# Patient Record
Sex: FEMALE | Race: BLACK OR AFRICAN AMERICAN | NOT HISPANIC OR LATINO
[De-identification: names, ages, dates, MRNs, and addresses within clinical notes are randomized per-mention and may not be internally consistent; named-entity substitution may affect disease eponyms.]

---

## 2019-02-16 ENCOUNTER — RESULT REVIEW (OUTPATIENT)
Age: 34
End: 2019-02-16

## 2019-08-23 ENCOUNTER — OUTPATIENT (OUTPATIENT)
Dept: OUTPATIENT SERVICES | Facility: HOSPITAL | Age: 34
LOS: 1 days | End: 2019-08-23

## 2019-08-23 VITALS
TEMPERATURE: 98 F | RESPIRATION RATE: 16 BRPM | WEIGHT: 119.05 LBS | DIASTOLIC BLOOD PRESSURE: 78 MMHG | HEART RATE: 92 BPM | SYSTOLIC BLOOD PRESSURE: 118 MMHG | HEIGHT: 66 IN

## 2019-08-23 DIAGNOSIS — R82.90 UNSPECIFIED ABNORMAL FINDINGS IN URINE: ICD-10-CM

## 2019-08-23 DIAGNOSIS — N84.0 POLYP OF CORPUS UTERI: ICD-10-CM

## 2019-08-23 DIAGNOSIS — Q05.9 SPINA BIFIDA, UNSPECIFIED: ICD-10-CM

## 2019-08-23 DIAGNOSIS — Z98.890 OTHER SPECIFIED POSTPROCEDURAL STATES: Chronic | ICD-10-CM

## 2019-08-23 DIAGNOSIS — Z91.040 LATEX ALLERGY STATUS: ICD-10-CM

## 2019-08-23 DIAGNOSIS — Z87.728 PERSONAL HISTORY OF OTHER SPECIFIED (CORRECTED) CONGENITAL MALFORMATIONS OF NERVOUS SYSTEM AND SENSE ORGANS: Chronic | ICD-10-CM

## 2019-08-23 LAB
ANION GAP SERPL CALC-SCNC: 9 MMO/L — SIGNIFICANT CHANGE UP (ref 7–14)
BUN SERPL-MCNC: 13 MG/DL — SIGNIFICANT CHANGE UP (ref 7–23)
CALCIUM SERPL-MCNC: 9.5 MG/DL — SIGNIFICANT CHANGE UP (ref 8.4–10.5)
CHLORIDE SERPL-SCNC: 104 MMOL/L — SIGNIFICANT CHANGE UP (ref 98–107)
CO2 SERPL-SCNC: 27 MMOL/L — SIGNIFICANT CHANGE UP (ref 22–31)
CREAT SERPL-MCNC: 0.55 MG/DL — SIGNIFICANT CHANGE UP (ref 0.5–1.3)
GLUCOSE SERPL-MCNC: 79 MG/DL — SIGNIFICANT CHANGE UP (ref 70–99)
HCT VFR BLD CALC: 43.7 % — SIGNIFICANT CHANGE UP (ref 34.5–45)
HGB BLD-MCNC: 13.7 G/DL — SIGNIFICANT CHANGE UP (ref 11.5–15.5)
MCHC RBC-ENTMCNC: 28.1 PG — SIGNIFICANT CHANGE UP (ref 27–34)
MCHC RBC-ENTMCNC: 31.4 % — LOW (ref 32–36)
MCV RBC AUTO: 89.7 FL — SIGNIFICANT CHANGE UP (ref 80–100)
NRBC # FLD: 0 K/UL — SIGNIFICANT CHANGE UP (ref 0–0)
PLATELET # BLD AUTO: 315 K/UL — SIGNIFICANT CHANGE UP (ref 150–400)
PMV BLD: 10 FL — SIGNIFICANT CHANGE UP (ref 7–13)
POTASSIUM SERPL-MCNC: 3.9 MMOL/L — SIGNIFICANT CHANGE UP (ref 3.5–5.3)
POTASSIUM SERPL-SCNC: 3.9 MMOL/L — SIGNIFICANT CHANGE UP (ref 3.5–5.3)
RBC # BLD: 4.87 M/UL — SIGNIFICANT CHANGE UP (ref 3.8–5.2)
RBC # FLD: 13.6 % — SIGNIFICANT CHANGE UP (ref 10.3–14.5)
SODIUM SERPL-SCNC: 140 MMOL/L — SIGNIFICANT CHANGE UP (ref 135–145)
WBC # BLD: 4.63 K/UL — SIGNIFICANT CHANGE UP (ref 3.8–10.5)
WBC # FLD AUTO: 4.63 K/UL — SIGNIFICANT CHANGE UP (ref 3.8–10.5)

## 2019-08-23 NOTE — H&P PST ADULT - NSICDXFAMILYHX_GEN_ALL_CORE_FT
FAMILY HISTORY:  Mother  Still living? Unknown  Family history of diabetes mellitus (DM), Age at diagnosis: Age Unknown  FH: HTN (hypertension), Age at diagnosis: Age Unknown

## 2019-08-23 NOTE — H&P PST ADULT - NSICDXPROBLEM_GEN_ALL_CORE_FT
PROBLEM DIAGNOSES  Problem: Polyp of corpus uteri  Assessment and Plan: Pt scheduled for surgery on 9/4/19.  Pre-op instructions provided. Pt verbalized understanding.   Pepcid provided for GI prophylaxis.   Pt given urine specimen cup for ucg on admission.     Problem: Cloudy urine  Assessment and Plan: Requested medical evaluation preop. Pt verbalized understanding.     Problem: Latex allergy  Assessment and Plan: OR booking notified.     Problem: Spina bifida  Assessment and Plan: Pt has not seen a neurologist in about 4 years.   Case discussed with anesthesia Dr. Watkins.  Will request last neuro visit note.

## 2019-08-23 NOTE — H&P PST ADULT - HISTORY OF PRESENT ILLNESS
33 year old female with c/o spotting between periods a few months ago. Pt went for evaluation with GYN and had u/s which revealed ueterine polyp. Pt presents today for presurgical evaluation for ... 33 year old female with c/o spotting between periods a few months ago. Pt went for evaluation with GYN and had u/s which revealed ueterine polyp. Pt presents today for presurgical evaluation for Dilation Curettage Hysteroscopy, Polypectomy with Symphion scheduled on 9/4/19.

## 2019-08-23 NOTE — H&P PST ADULT - NSICDXPASTMEDICALHX_GEN_ALL_CORE_FT
PAST MEDICAL HISTORY:  Bilateral foot-drop wears AFO foot brace    Breast fibroadenoma, left     Spina bifida diagnosed in 1999    Urinary retention self-catheterizes several times a day

## 2019-08-23 NOTE — H&P PST ADULT - NEUROLOGICAL COMMENTS
bilateral foot drop, reports weakness in legs and balance impairment bilateral foot drop, reports weakness in legs and some balance impairment - pt states she has not seen a neurologist in about 4 years

## 2019-08-23 NOTE — H&P PST ADULT - OTHER CARE PROVIDERS
Cardio - Dr. Heydi Galicia [phone] Cardio - Dr. Heydi Galicia [phone] (has not seen in several years), Neuro - Dr. Genao (has not seen in several years) Cardio - Dr. Heydi Galicia [phone] (has not seen in several years), Neuro - Dr. Genao [phone] (has not seen in several years)

## 2019-08-23 NOTE — H&P PST ADULT - NSICDXPASTSURGICALHX_GEN_ALL_CORE_FT
PAST SURGICAL HISTORY:  H/O spina bifida surgery in 1999,, 2001    S/P excision of fibroadenoma of breast

## 2019-08-23 NOTE — H&P PST ADULT - NEGATIVE ENMT SYMPTOMS
no dysphagia/no sinus symptoms/no vertigo/no throat pain/no hearing difficulty/no ear pain/no tinnitus

## 2019-08-23 NOTE — H&P PST ADULT - NEGATIVE NEUROLOGICAL SYMPTOMS
no syncope/no focal seizures/no weakness/no generalized seizures no syncope/no generalized seizures/no focal seizures

## 2019-09-01 ENCOUNTER — TRANSCRIPTION ENCOUNTER (OUTPATIENT)
Age: 34
End: 2019-09-01

## 2019-09-03 ENCOUNTER — TRANSCRIPTION ENCOUNTER (OUTPATIENT)
Age: 34
End: 2019-09-03

## 2019-09-03 RX ORDER — SODIUM CHLORIDE 9 MG/ML
1000 INJECTION, SOLUTION INTRAVENOUS
Refills: 0 | Status: DISCONTINUED | OUTPATIENT
Start: 2019-09-04 | End: 2019-09-19

## 2019-09-03 NOTE — ASU PATIENT PROFILE, ADULT - PMH
Bilateral foot-drop  wears AFO foot brace  Breast fibroadenoma, left    Spina bifida  diagnosed in 1999  Urinary retention  self-catheterizes several times a day

## 2019-09-04 ENCOUNTER — RESULT REVIEW (OUTPATIENT)
Age: 34
End: 2019-09-04

## 2019-09-04 ENCOUNTER — OUTPATIENT (OUTPATIENT)
Dept: OUTPATIENT SERVICES | Facility: HOSPITAL | Age: 34
LOS: 1 days | Discharge: HOME | End: 2019-09-04
Payer: COMMERCIAL

## 2019-09-04 VITALS
OXYGEN SATURATION: 98 % | HEART RATE: 84 BPM | RESPIRATION RATE: 12 BRPM | SYSTOLIC BLOOD PRESSURE: 123 MMHG | DIASTOLIC BLOOD PRESSURE: 53 MMHG

## 2019-09-04 VITALS
OXYGEN SATURATION: 99 % | HEART RATE: 107 BPM | SYSTOLIC BLOOD PRESSURE: 127 MMHG | DIASTOLIC BLOOD PRESSURE: 74 MMHG | TEMPERATURE: 99 F | HEIGHT: 66 IN | RESPIRATION RATE: 18 BRPM | WEIGHT: 119.05 LBS

## 2019-09-04 DIAGNOSIS — Z87.728 PERSONAL HISTORY OF OTHER SPECIFIED (CORRECTED) CONGENITAL MALFORMATIONS OF NERVOUS SYSTEM AND SENSE ORGANS: Chronic | ICD-10-CM

## 2019-09-04 DIAGNOSIS — Z98.890 OTHER SPECIFIED POSTPROCEDURAL STATES: Chronic | ICD-10-CM

## 2019-09-04 DIAGNOSIS — N84.0 POLYP OF CORPUS UTERI: ICD-10-CM

## 2019-09-04 LAB — HCG UR QL: NEGATIVE — SIGNIFICANT CHANGE UP

## 2019-09-04 PROCEDURE — 88305 TISSUE EXAM BY PATHOLOGIST: CPT | Mod: 26

## 2019-09-04 NOTE — ASU DISCHARGE PLAN (ADULT/PEDIATRIC) - NURSING INSTRUCTIONS
You were given 1000mg IV Tylenol for pain management.  Please DO NOT take any Tylenol containing products, such as  Vicodin, Percocet, Excedrin, many cold preparations for the next 6 hours (until 2p).  DO NOT EXCEED 3000MG OF TYLENOL OVER 24 HOURS.   You were given Toradol for pain management. Please DO Not take Motrin/Ibuprofen/Advil/Aleve (NSAIDS) for the next 6 hours (Until 2p)

## 2019-09-04 NOTE — ASU DISCHARGE PLAN (ADULT/PEDIATRIC) - CARE PROVIDER_API CALL
Piyush George)  Obstetrics and Gynecology  [address] [place] [address]  Phone: [phone]  Fax: [phone]  Follow Up Time:

## 2020-10-13 NOTE — H&P PST ADULT - NSANTHGENDERRD_ENT_A_CORE
INTERVAL HPI/OVERNIGHT EVENTS:  Patient seen at bedside.      VITAL SIGNS:  T(F): 98.9 (10-13-20 @ 10:00)  HR: 68 (10-13-20 @ 10:00)  BP: 100/52 (10-13-20 @ 10:00)  RR: 18 (10-13-20 @ 10:00)  SpO2: 100% (10-13-20 @ 10:00)  Wt(kg): --    PHYSICAL EXAM:    Constitutional: NAD, resting in bed comfortably  Eyes: EOMI, sclera non-icteric  Neck: supple, no LAD  Respiratory: CTA b/l, good air entry b/l, no wheezing, rhonchi or crackles  Cardiovascular: RRR, normal S1S2, no M/R/G  Gastrointestinal: soft, NTND  Extremities: no edema  Neurological: AAOx3, non focal  Skin: Normal temperature    MEDICATIONS  (STANDING):  chlorhexidine 4% Liquid 1 Application(s) Topical daily  enoxaparin Injectable 40 milliGRAM(s) SubCutaneous daily  midodrine. 10 milliGRAM(s) Oral every 8 hours  ondansetron Injectable 4 milliGRAM(s) IV Push every 8 hours  pancrelipase  (CREON 12,000 Lipase Units) 1 Capsule(s) Oral three times a day with meals    MEDICATIONS  (PRN):  acetaminophen   Tablet .. 650 milliGRAM(s) Oral every 6 hours PRN Temp greater or equal to 38C (100.4F)      Allergies    No Known Allergies    Intolerances        LABS:                        10.6   5.73  )-----------( 168      ( 13 Oct 2020 06:00 )             32.4     10-13    136  |  103  |  8   ----------------------------<  82  3.6   |  22  |  0.40<L>    Ca    9.5      13 Oct 2020 06:00  Phos  3.1     10-13  Mg     2.1     10-13    TPro  7.9  /  Alb  3.7  /  TBili  0.3  /  DBili  x   /  AST  107<H>  /  ALT  100<H>  /  AlkPhos  159<H>  10-13          RADIOLOGY & ADDITIONAL TESTS:  Studies reviewed.   INTERVAL HPI/OVERNIGHT EVENTS:  Patient seen at bedside.  Patient continues to feel weak  Usually feels this way after chemo  Afebrile overnight        VITAL SIGNS:  T(F): 98.9 (10-13-20 @ 10:00)  HR: 68 (10-13-20 @ 10:00)  BP: 100/52 (10-13-20 @ 10:00)  RR: 18 (10-13-20 @ 10:00)  SpO2: 100% (10-13-20 @ 10:00)  Wt(kg): --    PHYSICAL EXAM:    In accordance with current standard limiting patient contact, deferred physical exam  2/2 COVID pandemic  Please refer to physical exam of primary team.    MEDICATIONS  (STANDING):  chlorhexidine 4% Liquid 1 Application(s) Topical daily  enoxaparin Injectable 40 milliGRAM(s) SubCutaneous daily  midodrine. 10 milliGRAM(s) Oral every 8 hours  ondansetron Injectable 4 milliGRAM(s) IV Push every 8 hours  pancrelipase  (CREON 12,000 Lipase Units) 1 Capsule(s) Oral three times a day with meals    MEDICATIONS  (PRN):  acetaminophen   Tablet .. 650 milliGRAM(s) Oral every 6 hours PRN Temp greater or equal to 38C (100.4F)      Allergies    No Known Allergies    Intolerances        LABS:                        10.6   5.73  )-----------( 168      ( 13 Oct 2020 06:00 )             32.4     10-13    136  |  103  |  8   ----------------------------<  82  3.6   |  22  |  0.40<L>    Ca    9.5      13 Oct 2020 06:00  Phos  3.1     10-13  Mg     2.1     10-13    TPro  7.9  /  Alb  3.7  /  TBili  0.3  /  DBili  x   /  AST  107<H>  /  ALT  100<H>  /  AlkPhos  159<H>  10-13          RADIOLOGY & ADDITIONAL TESTS:  Studies reviewed.   No

## 2021-03-15 ENCOUNTER — RESULT REVIEW (OUTPATIENT)
Age: 36
End: 2021-03-15

## 2021-05-05 ENCOUNTER — RESULT REVIEW (OUTPATIENT)
Age: 36
End: 2021-05-05

## 2021-05-26 ENCOUNTER — RESULT REVIEW (OUTPATIENT)
Age: 36
End: 2021-05-26

## 2021-07-08 ENCOUNTER — RESULT REVIEW (OUTPATIENT)
Age: 36
End: 2021-07-08

## 2021-09-28 PROBLEM — D24.2 BENIGN NEOPLASM OF LEFT BREAST: Chronic | Status: ACTIVE | Noted: 2019-08-23

## 2021-09-28 PROBLEM — R33.9 RETENTION OF URINE, UNSPECIFIED: Chronic | Status: ACTIVE | Noted: 2019-08-23

## 2021-09-28 PROBLEM — Q05.9 SPINA BIFIDA, UNSPECIFIED: Chronic | Status: ACTIVE | Noted: 2019-08-23

## 2021-09-28 PROBLEM — M21.371 FOOT DROP, RIGHT FOOT: Chronic | Status: ACTIVE | Noted: 2019-08-23

## 2021-10-04 ENCOUNTER — APPOINTMENT (OUTPATIENT)
Dept: OBGYN | Facility: CLINIC | Age: 36
End: 2021-10-04

## 2021-10-04 ENCOUNTER — APPOINTMENT (OUTPATIENT)
Dept: ANTEPARTUM | Facility: CLINIC | Age: 36
End: 2021-10-04
Payer: COMMERCIAL

## 2021-10-04 PROCEDURE — 76813 OB US NUCHAL MEAS 1 GEST: CPT

## 2021-10-04 PROCEDURE — 76801 OB US < 14 WKS SINGLE FETUS: CPT

## 2021-10-06 ENCOUNTER — EMERGENCY (EMERGENCY)
Facility: HOSPITAL | Age: 36
LOS: 1 days | Discharge: HOME | End: 2021-10-06
Attending: STUDENT IN AN ORGANIZED HEALTH CARE EDUCATION/TRAINING PROGRAM | Admitting: STUDENT IN AN ORGANIZED HEALTH CARE EDUCATION/TRAINING PROGRAM
Payer: COMMERCIAL

## 2021-10-06 VITALS
RESPIRATION RATE: 16 BRPM | OXYGEN SATURATION: 100 % | SYSTOLIC BLOOD PRESSURE: 131 MMHG | DIASTOLIC BLOOD PRESSURE: 76 MMHG | HEART RATE: 81 BPM | TEMPERATURE: 99 F

## 2021-10-06 VITALS
HEIGHT: 66 IN | DIASTOLIC BLOOD PRESSURE: 87 MMHG | OXYGEN SATURATION: 100 % | HEART RATE: 110 BPM | RESPIRATION RATE: 18 BRPM | SYSTOLIC BLOOD PRESSURE: 137 MMHG | TEMPERATURE: 98 F

## 2021-10-06 DIAGNOSIS — Z87.728 PERSONAL HISTORY OF OTHER SPECIFIED (CORRECTED) CONGENITAL MALFORMATIONS OF NERVOUS SYSTEM AND SENSE ORGANS: Chronic | ICD-10-CM

## 2021-10-06 DIAGNOSIS — Z98.890 OTHER SPECIFIED POSTPROCEDURAL STATES: Chronic | ICD-10-CM

## 2021-10-06 PROCEDURE — 99284 EMERGENCY DEPT VISIT MOD MDM: CPT

## 2021-10-06 NOTE — ED PROVIDER NOTE - CLINICAL SUMMARY MEDICAL DECISION MAKING FREE TEXT BOX
37 y/o F 12 weeks pregnant p/w blurry vision which has since resolved. Case discussed w/ OB. Plan to document fetal heart rate. Pt does not want labs at this time because she states she has other obligations. Pt will f/u w/ OB as an outpatient. Strict return precautions given. Fetal heart rate 167.

## 2021-10-06 NOTE — ED ADULT TRIAGE NOTE - CHIEF COMPLAINT QUOTE
pt is 11wks preg. coming with 1 episode of blur vison that lasted 15min, with lightheaded and her /100 as per pt never had hi BP, denies Vag. bleeding, no ABD pain.

## 2021-10-06 NOTE — ED PROVIDER NOTE - PATIENT PORTAL LINK FT
You can access the FollowMyHealth Patient Portal offered by [facility] by registering at the following website: [email or web address]. By joining [facility]’s FollowMyHealth portal, you will also be able to view your health information using other applications (apps) compatible with our system.

## 2021-10-06 NOTE — ED PROVIDER NOTE - OBJECTIVE STATEMENT
37 y/o F 12 weeks pregnant (IVF) w/ PMHx spine bifida presents to the ED w/ blurred vision which resolved after 15 minutes. Pt states she checked her BP which was 210/100 and called her OB/GYN who referred her to the ED. Pt reports similar symptoms in the past that also resolve quickly. Denies vaginal bleeding or discharge, fever, cough, vomiting, diarrhea, or no pain in urination. Pt has had fibro adenoma removed on L breast, allergic to Macrobid and dexamethasone.
No

## 2021-10-06 NOTE — ED ADULT NURSE NOTE - OBJECTIVE STATEMENT
Pt arriving to intake room 4 A&OX4 ambulatory with a cane at baseline c/o blurry vision for 15 mins yesterday. Pt states BP was very high. Pt 11 weeks pregnant with 1st pregnancy. Symptoms have resolved. Pt denies CP, SOB, HA, blurry vision, abdominal pain, vaginal bleeding. VS as charted. MD garcia pending.

## 2021-10-06 NOTE — ED ADULT NURSE NOTE - DOES PATIENT HAVE ADVANCE DIRECTIVE
from 10/18 at 40.6 weeks gestation.   from 10/18 at 40.6 weeks gestation.  with an RML. Received methergine x1  from 10/18 at 40.6 weeks gestation. -infant in NICU No

## 2021-10-06 NOTE — ED PROVIDER NOTE - PHYSICAL EXAMINATION
CONSTITUTIONAL: Non-toxic, non-diaphoretic, in no apparent distress  HEAD: Normocephalic; atruamatic  EYES: EOM intact, OD 20/20, OS 20/20  ENMT: External appears normal; normal oropharynx, moist  NECK: grossly normal active ROM,  CARD: No cyanosis, good peripheral perfusion, RRR  RESP: Normal chest excursion with respiration; no increased work of breathing  ABD: non-distended   EXT: moving all extremities, no gross disfigurement or asymmetry,  SKIN: Warm, dry, no rash  NEURO:  moving all extremities, no facial droop, no dysarthria      cn2-12 intact  5/5 all extremities

## 2021-10-28 NOTE — ASU PREOP CHECKLIST - TYPE OF SOLUTION
Pt called the office and stated that she lives 3.5 hours away, so she will not be following up with Dr. Blair. The pt also stated that she does not need her medication refilled, and that she still has pills left.    LR

## 2021-11-16 ENCOUNTER — APPOINTMENT (OUTPATIENT)
Dept: ANTEPARTUM | Facility: CLINIC | Age: 36
End: 2021-11-16
Payer: COMMERCIAL

## 2021-11-16 VITALS
BODY MASS INDEX: 19.44 KG/M2 | HEIGHT: 66 IN | WEIGHT: 121 LBS | DIASTOLIC BLOOD PRESSURE: 66 MMHG | SYSTOLIC BLOOD PRESSURE: 102 MMHG

## 2021-11-16 PROCEDURE — 76811 OB US DETAILED SNGL FETUS: CPT

## 2021-12-01 ENCOUNTER — APPOINTMENT (OUTPATIENT)
Dept: ANTEPARTUM | Facility: CLINIC | Age: 36
End: 2021-12-01
Payer: COMMERCIAL

## 2021-12-01 ENCOUNTER — APPOINTMENT (OUTPATIENT)
Dept: OBGYN | Facility: CLINIC | Age: 36
End: 2021-12-01

## 2021-12-01 VITALS — SYSTOLIC BLOOD PRESSURE: 109 MMHG | BODY MASS INDEX: 20.01 KG/M2 | WEIGHT: 124 LBS | DIASTOLIC BLOOD PRESSURE: 70 MMHG

## 2021-12-01 VITALS
DIASTOLIC BLOOD PRESSURE: 70 MMHG | SYSTOLIC BLOOD PRESSURE: 109 MMHG | HEIGHT: 66 IN | WEIGHT: 124 LBS | BODY MASS INDEX: 19.93 KG/M2

## 2021-12-01 DIAGNOSIS — Z3A.19 19 WEEKS GESTATION OF PREGNANCY: ICD-10-CM

## 2021-12-01 DIAGNOSIS — Z82.49 FAMILY HISTORY OF ISCHEMIC HEART DISEASE AND OTHER DISEASES OF THE CIRCULATORY SYSTEM: ICD-10-CM

## 2021-12-01 DIAGNOSIS — Z78.9 OTHER SPECIFIED HEALTH STATUS: ICD-10-CM

## 2021-12-01 DIAGNOSIS — Z86.018 PERSONAL HISTORY OF OTHER BENIGN NEOPLASM: ICD-10-CM

## 2021-12-01 DIAGNOSIS — O09.812 SUPERVISION OF PREGNANCY RESULTING FROM ASSISTED REPRODUCTIVE TECHNOLOGY, SECOND TRIMESTER: ICD-10-CM

## 2021-12-01 DIAGNOSIS — Z83.3 FAMILY HISTORY OF DIABETES MELLITUS: ICD-10-CM

## 2021-12-01 DIAGNOSIS — Z83.42 FAMILY HISTORY OF FAMILIAL HYPERCHOLESTEROLEMIA: ICD-10-CM

## 2021-12-01 DIAGNOSIS — Z87.728 PERSONAL HISTORY OF OTHER SPECIFIED (CORRECTED) CONGENITAL MALFORMATIONS OF NERVOUS SYSTEM AND SENSE ORGANS: ICD-10-CM

## 2021-12-01 PROCEDURE — 76815 OB US LIMITED FETUS(S): CPT

## 2021-12-01 NOTE — OB HISTORY
[___] : Living: [unfilled] [SRIKANTH: ___] : SRIKANTH: [unfilled] [EGA: ___ wks] : EGA: [unfilled] wks [Reproductive Assisted] : Reproductive Assisted conception

## 2021-12-01 NOTE — SURGICAL HISTORY
[Fibroids] : fibroids [Infertility] : infertility [Abn Paps] : no abnormal pap smears [Breast Disease] : no breast disease [STI's] : no STI's [Cysts] : no cysts

## 2021-12-01 NOTE — HISTORY OF PRESENT ILLNESS
[FreeTextEntry1] : Patient is a 36 year old female, with medical history significant for spina bifida, currently at 19.5 weeks GA, with high-risk IVF pregnancy. Patient reports that her and her  were unable to become pregnant x 6 months. They saw a fertility specialist and patient was told she had diminished ovarian reserves. They made the decision to do IVF, but with no donor egg or donor sperm.\par \par Patient was seen for a fetal anatomical survey and limitations of ultrasound were discussed with her. The patient was informed that ultrasound cannot detect all anomalies. On scan today PRUV was noted.\par \par The patient was reassured. Explained to patient that invasive testing is not recommended based on these findings.

## 2021-12-01 NOTE — DISCUSSION/SUMMARY
[FreeTextEntry1] : Patient is a 36 year old, with medical history significant for spina bifida, currently at 19.5 weeks GA, with high-risk IVF pregnancy. She is a patient of Dr. Curry, seen in our office today for follow up anatomy scan after early anatomy two weeks ago was limited. Anatomy scan today unremarkable except for presence of persistent right umbilical vein. Patient recently consulted with anesthesiology. They were able to review her most recent MRI reports and determined that a spinal or epidural is contraindicated, as L3-L5 in patient's spine contains many abnormalities both from surgical scarring and natural pathologies due to spina bifida. I discussed with patient that from an MFM perspective she should be able to attempt vaginal birth, and could be managed with IV analgesia. She understands that this would provide less effective pain relief, but is agreeable. Additionally, patient and I discussed her bowel and bladder functions. She is continent of stool, but has urinary incontinence - for which she intermittently catheterizes herself. Therefore, would recommend routine evaluation of urine for infection. Requisition sent for fetal echo as this is an IVF pregnancy. Recommendation for patient to follow up with this office monthly due to the high-risk nature of this pregnancy.

## 2021-12-07 ENCOUNTER — APPOINTMENT (OUTPATIENT)
Dept: PEDIATRIC CARDIOLOGY | Facility: CLINIC | Age: 36
End: 2021-12-07
Payer: COMMERCIAL

## 2021-12-07 PROCEDURE — 76825 ECHO EXAM OF FETAL HEART: CPT

## 2021-12-07 PROCEDURE — 99202 OFFICE O/P NEW SF 15 MIN: CPT

## 2021-12-07 PROCEDURE — 76827 ECHO EXAM OF FETAL HEART: CPT

## 2021-12-07 PROCEDURE — 76820 UMBILICAL ARTERY ECHO: CPT

## 2021-12-07 PROCEDURE — 93325 DOPPLER ECHO COLOR FLOW MAPG: CPT | Mod: 59

## 2021-12-20 ENCOUNTER — APPOINTMENT (OUTPATIENT)
Dept: SPINE | Facility: CLINIC | Age: 36
End: 2021-12-20

## 2021-12-27 ENCOUNTER — TRANSCRIPTION ENCOUNTER (OUTPATIENT)
Age: 36
End: 2021-12-27

## 2022-01-03 ENCOUNTER — APPOINTMENT (OUTPATIENT)
Dept: OBGYN | Facility: CLINIC | Age: 37
End: 2022-01-03

## 2022-01-03 VITALS — DIASTOLIC BLOOD PRESSURE: 65 MMHG | WEIGHT: 131 LBS | SYSTOLIC BLOOD PRESSURE: 103 MMHG | BODY MASS INDEX: 21.14 KG/M2

## 2022-01-31 ENCOUNTER — APPOINTMENT (OUTPATIENT)
Dept: OBGYN | Facility: CLINIC | Age: 37
End: 2022-01-31

## 2022-03-02 ENCOUNTER — APPOINTMENT (OUTPATIENT)
Dept: OBGYN | Facility: CLINIC | Age: 37
End: 2022-03-02
Payer: COMMERCIAL

## 2022-03-02 ENCOUNTER — APPOINTMENT (OUTPATIENT)
Dept: ANTEPARTUM | Facility: CLINIC | Age: 37
End: 2022-03-02
Payer: COMMERCIAL

## 2022-03-02 VITALS
BODY MASS INDEX: 22.34 KG/M2 | SYSTOLIC BLOOD PRESSURE: 110 MMHG | WEIGHT: 139 LBS | DIASTOLIC BLOOD PRESSURE: 70 MMHG | HEIGHT: 66 IN

## 2022-03-02 PROCEDURE — 76819 FETAL BIOPHYS PROFIL W/O NST: CPT

## 2022-03-02 PROCEDURE — 76816 OB US FOLLOW-UP PER FETUS: CPT

## 2022-03-02 PROCEDURE — 0502F SUBSEQUENT PRENATAL CARE: CPT

## 2022-03-16 ENCOUNTER — NON-APPOINTMENT (OUTPATIENT)
Age: 37
End: 2022-03-16

## 2022-03-17 ENCOUNTER — APPOINTMENT (OUTPATIENT)
Dept: CARDIOLOGY | Facility: CLINIC | Age: 37
End: 2022-03-17
Payer: COMMERCIAL

## 2022-03-17 PROCEDURE — 93306 TTE W/DOPPLER COMPLETE: CPT

## 2022-03-28 ENCOUNTER — ASOB RESULT (OUTPATIENT)
Age: 37
End: 2022-03-28

## 2022-03-28 ENCOUNTER — APPOINTMENT (OUTPATIENT)
Dept: ANTEPARTUM | Facility: CLINIC | Age: 37
End: 2022-03-28

## 2022-03-28 ENCOUNTER — APPOINTMENT (OUTPATIENT)
Dept: ANTEPARTUM | Facility: CLINIC | Age: 37
End: 2022-03-28
Payer: COMMERCIAL

## 2022-03-28 PROCEDURE — 76816 OB US FOLLOW-UP PER FETUS: CPT

## 2022-03-28 PROCEDURE — 76819 FETAL BIOPHYS PROFIL W/O NST: CPT

## 2022-03-29 ENCOUNTER — APPOINTMENT (OUTPATIENT)
Dept: CARDIOLOGY | Facility: CLINIC | Age: 37
End: 2022-03-29
Payer: COMMERCIAL

## 2022-03-29 ENCOUNTER — NON-APPOINTMENT (OUTPATIENT)
Age: 37
End: 2022-03-29

## 2022-03-29 VITALS
HEIGHT: 66 IN | HEART RATE: 122 BPM | BODY MASS INDEX: 22.66 KG/M2 | DIASTOLIC BLOOD PRESSURE: 75 MMHG | SYSTOLIC BLOOD PRESSURE: 117 MMHG | OXYGEN SATURATION: 100 % | WEIGHT: 141 LBS

## 2022-03-29 DIAGNOSIS — O09.512 SUPERVISION OF ELDERLY PRIMIGRAVIDA, SECOND TRIMESTER: ICD-10-CM

## 2022-03-29 PROCEDURE — 99204 OFFICE O/P NEW MOD 45 MIN: CPT

## 2022-03-29 PROCEDURE — 93000 ELECTROCARDIOGRAM COMPLETE: CPT

## 2022-03-30 PROBLEM — O09.512 ADVANCED MATERNAL AGE, PRIMIGRAVIDA, ANTEPARTUM, SECOND TRIMESTER: Status: ACTIVE | Noted: 2021-12-01

## 2022-03-30 NOTE — HISTORY OF PRESENT ILLNESS
[FreeTextEntry1] : Asmita is a 36-year-old female spina bifida 36 weeks gestation first pregnancy who presents for cardiac clearance for . One day had elevated blood pressure. Ambulates slowly with cane. Gained about 25 pounds and easily fatigued and short of breath. No PND or orthopnea.

## 2022-03-30 NOTE — DISCUSSION/SUMMARY
[FreeTextEntry1] : The patient is a 36-year-old female spina bifida, 36 weeks gestation with fatigue. \par #1 CV- normal LV function, moderate to severe MR with normal pulmonary pressures, should be repeated after pregnancy at 3-4 months\par #2 Neuro- no candidate for epidural or spinal as she has spinabifida, ambulates with cane\par #3 Ob- 36 weeks first pregnancy, on aspirin, folic acid and prenatal\par There are no cardiac contraindications to  with general anesthesia.\par All questions answered.

## 2022-04-04 ENCOUNTER — ASOB RESULT (OUTPATIENT)
Age: 37
End: 2022-04-04

## 2022-04-04 ENCOUNTER — APPOINTMENT (OUTPATIENT)
Dept: ANTEPARTUM | Facility: CLINIC | Age: 37
End: 2022-04-04
Payer: COMMERCIAL

## 2022-04-04 PROCEDURE — 76818 FETAL BIOPHYS PROFILE W/NST: CPT

## 2022-04-08 ENCOUNTER — NON-APPOINTMENT (OUTPATIENT)
Age: 37
End: 2022-04-08

## 2022-04-11 ENCOUNTER — ASOB RESULT (OUTPATIENT)
Age: 37
End: 2022-04-11

## 2022-04-11 ENCOUNTER — APPOINTMENT (OUTPATIENT)
Dept: ANTEPARTUM | Facility: CLINIC | Age: 37
End: 2022-04-11
Payer: COMMERCIAL

## 2022-04-11 PROCEDURE — 76818 FETAL BIOPHYS PROFILE W/NST: CPT

## 2022-04-16 ENCOUNTER — OUTPATIENT (OUTPATIENT)
Dept: OUTPATIENT SERVICES | Facility: HOSPITAL | Age: 37
LOS: 1 days | End: 2022-04-16
Payer: COMMERCIAL

## 2022-04-16 DIAGNOSIS — Z87.728 PERSONAL HISTORY OF OTHER SPECIFIED (CORRECTED) CONGENITAL MALFORMATIONS OF NERVOUS SYSTEM AND SENSE ORGANS: Chronic | ICD-10-CM

## 2022-04-16 DIAGNOSIS — Z11.52 ENCOUNTER FOR SCREENING FOR COVID-19: ICD-10-CM

## 2022-04-16 DIAGNOSIS — Z98.890 OTHER SPECIFIED POSTPROCEDURAL STATES: Chronic | ICD-10-CM

## 2022-04-16 LAB — SARS-COV-2 RNA SPEC QL NAA+PROBE: SIGNIFICANT CHANGE UP

## 2022-04-16 PROCEDURE — U0003: CPT

## 2022-04-16 PROCEDURE — C9803: CPT

## 2022-04-16 PROCEDURE — U0005: CPT

## 2022-04-17 ENCOUNTER — TRANSCRIPTION ENCOUNTER (OUTPATIENT)
Age: 37
End: 2022-04-17

## 2022-04-18 ENCOUNTER — APPOINTMENT (OUTPATIENT)
Dept: ANTEPARTUM | Facility: CLINIC | Age: 37
End: 2022-04-18

## 2022-04-18 ENCOUNTER — INPATIENT (INPATIENT)
Facility: HOSPITAL | Age: 37
LOS: 2 days | Discharge: HOME | End: 2022-04-21
Attending: OBSTETRICS & GYNECOLOGY | Admitting: OBSTETRICS & GYNECOLOGY
Payer: COMMERCIAL

## 2022-04-18 VITALS
SYSTOLIC BLOOD PRESSURE: 122 MMHG | HEIGHT: 66 IN | RESPIRATION RATE: 18 BRPM | WEIGHT: 145.06 LBS | DIASTOLIC BLOOD PRESSURE: 70 MMHG | HEART RATE: 95 BPM | OXYGEN SATURATION: 99 % | TEMPERATURE: 98 F

## 2022-04-18 DIAGNOSIS — Z87.728 PERSONAL HISTORY OF OTHER SPECIFIED (CORRECTED) CONGENITAL MALFORMATIONS OF NERVOUS SYSTEM AND SENSE ORGANS: Chronic | ICD-10-CM

## 2022-04-18 DIAGNOSIS — Z98.890 OTHER SPECIFIED POSTPROCEDURAL STATES: Chronic | ICD-10-CM

## 2022-04-18 LAB
BASOPHILS # BLD AUTO: 0.08 K/UL — SIGNIFICANT CHANGE UP (ref 0–0.2)
BASOPHILS NFR BLD AUTO: 0.7 % — SIGNIFICANT CHANGE UP (ref 0–2)
BLD GP AB SCN SERPL QL: NEGATIVE — SIGNIFICANT CHANGE UP
COVID-19 SPIKE DOMAIN AB INTERP: POSITIVE
COVID-19 SPIKE DOMAIN ANTIBODY RESULT: >250 U/ML — HIGH
EOSINOPHIL # BLD AUTO: 0.06 K/UL — SIGNIFICANT CHANGE UP (ref 0–0.5)
EOSINOPHIL NFR BLD AUTO: 0.5 % — SIGNIFICANT CHANGE UP (ref 0–6)
HCT VFR BLD CALC: 36.7 % — SIGNIFICANT CHANGE UP (ref 34.5–45)
HGB BLD-MCNC: 11.3 G/DL — LOW (ref 11.5–15.5)
IMM GRANULOCYTES NFR BLD AUTO: 0.9 % — SIGNIFICANT CHANGE UP (ref 0–1.5)
LYMPHOCYTES # BLD AUTO: 2.77 K/UL — SIGNIFICANT CHANGE UP (ref 1–3.3)
LYMPHOCYTES # BLD AUTO: 24.9 % — SIGNIFICANT CHANGE UP (ref 13–44)
MCHC RBC-ENTMCNC: 24 PG — LOW (ref 27–34)
MCHC RBC-ENTMCNC: 30.8 GM/DL — LOW (ref 32–36)
MCV RBC AUTO: 78.1 FL — LOW (ref 80–100)
MONOCYTES # BLD AUTO: 0.76 K/UL — SIGNIFICANT CHANGE UP (ref 0–0.9)
MONOCYTES NFR BLD AUTO: 6.8 % — SIGNIFICANT CHANGE UP (ref 2–14)
NEUTROPHILS # BLD AUTO: 7.35 K/UL — SIGNIFICANT CHANGE UP (ref 1.8–7.4)
NEUTROPHILS NFR BLD AUTO: 66.2 % — SIGNIFICANT CHANGE UP (ref 43–77)
NRBC # BLD: 0 /100 WBCS — SIGNIFICANT CHANGE UP (ref 0–0)
PLATELET # BLD AUTO: 300 K/UL — SIGNIFICANT CHANGE UP (ref 150–400)
RBC # BLD: 4.7 M/UL — SIGNIFICANT CHANGE UP (ref 3.8–5.2)
RBC # FLD: 16.4 % — HIGH (ref 10.3–14.5)
RH IG SCN BLD-IMP: POSITIVE — SIGNIFICANT CHANGE UP
SARS-COV-2 IGG+IGM SERPL QL IA: >250 U/ML — HIGH
SARS-COV-2 IGG+IGM SERPL QL IA: POSITIVE
T PALLIDUM AB TITR SER: NEGATIVE — SIGNIFICANT CHANGE UP
WBC # BLD: 11.12 K/UL — HIGH (ref 3.8–10.5)
WBC # FLD AUTO: 11.12 K/UL — HIGH (ref 3.8–10.5)

## 2022-04-18 PROCEDURE — 59514 CESAREAN DELIVERY ONLY: CPT | Mod: AS

## 2022-04-18 RX ORDER — CEFAZOLIN SODIUM 1 G
VIAL (EA) INJECTION
Refills: 0 | Status: DISCONTINUED | OUTPATIENT
Start: 2022-04-18 | End: 2022-04-20

## 2022-04-18 RX ORDER — SENNA PLUS 8.6 MG/1
2 TABLET ORAL
Refills: 0 | Status: DISCONTINUED | OUTPATIENT
Start: 2022-04-18 | End: 2022-04-21

## 2022-04-18 RX ORDER — HYDROMORPHONE HYDROCHLORIDE 2 MG/ML
1 INJECTION INTRAMUSCULAR; INTRAVENOUS; SUBCUTANEOUS
Refills: 0 | Status: DISCONTINUED | OUTPATIENT
Start: 2022-04-18 | End: 2022-04-19

## 2022-04-18 RX ORDER — OXYTOCIN 10 UNIT/ML
333.33 VIAL (ML) INJECTION
Qty: 20 | Refills: 0 | Status: DISCONTINUED | OUTPATIENT
Start: 2022-04-18 | End: 2022-04-18

## 2022-04-18 RX ORDER — HYDROMORPHONE HYDROCHLORIDE 2 MG/ML
0.5 INJECTION INTRAMUSCULAR; INTRAVENOUS; SUBCUTANEOUS
Refills: 0 | Status: DISCONTINUED | OUTPATIENT
Start: 2022-04-18 | End: 2022-04-19

## 2022-04-18 RX ORDER — SIMETHICONE 80 MG/1
80 TABLET, CHEWABLE ORAL EVERY 4 HOURS
Refills: 0 | Status: DISCONTINUED | OUTPATIENT
Start: 2022-04-18 | End: 2022-04-21

## 2022-04-18 RX ORDER — MAGNESIUM HYDROXIDE 400 MG/1
30 TABLET, CHEWABLE ORAL
Refills: 0 | Status: DISCONTINUED | OUTPATIENT
Start: 2022-04-18 | End: 2022-04-21

## 2022-04-18 RX ORDER — SODIUM CHLORIDE 9 MG/ML
1000 INJECTION, SOLUTION INTRAVENOUS
Refills: 0 | Status: DISCONTINUED | OUTPATIENT
Start: 2022-04-18 | End: 2022-04-18

## 2022-04-18 RX ORDER — ONDANSETRON 8 MG/1
4 TABLET, FILM COATED ORAL EVERY 6 HOURS
Refills: 0 | Status: DISCONTINUED | OUTPATIENT
Start: 2022-04-18 | End: 2022-04-19

## 2022-04-18 RX ORDER — OXYTOCIN 10 UNIT/ML
333.33 VIAL (ML) INJECTION
Qty: 20 | Refills: 0 | Status: DISCONTINUED | OUTPATIENT
Start: 2022-04-18 | End: 2022-04-21

## 2022-04-18 RX ORDER — NALOXONE HYDROCHLORIDE 4 MG/.1ML
0.1 SPRAY NASAL
Refills: 0 | Status: DISCONTINUED | OUTPATIENT
Start: 2022-04-18 | End: 2022-04-19

## 2022-04-18 RX ORDER — KETOROLAC TROMETHAMINE 30 MG/ML
30 SYRINGE (ML) INJECTION EVERY 6 HOURS
Refills: 0 | Status: DISCONTINUED | OUTPATIENT
Start: 2022-04-18 | End: 2022-04-20

## 2022-04-18 RX ORDER — LANOLIN
1 OINTMENT (GRAM) TOPICAL EVERY 6 HOURS
Refills: 0 | Status: DISCONTINUED | OUTPATIENT
Start: 2022-04-18 | End: 2022-04-21

## 2022-04-18 RX ORDER — POLYETHYLENE GLYCOL 3350 17 G/17G
17 POWDER, FOR SOLUTION ORAL EVERY 12 HOURS
Refills: 0 | Status: DISCONTINUED | OUTPATIENT
Start: 2022-04-18 | End: 2022-04-21

## 2022-04-18 RX ORDER — SODIUM CHLORIDE 9 MG/ML
1000 INJECTION, SOLUTION INTRAVENOUS ONCE
Refills: 0 | Status: COMPLETED | OUTPATIENT
Start: 2022-04-18 | End: 2022-04-18

## 2022-04-18 RX ORDER — CEFAZOLIN SODIUM 1 G
VIAL (EA) INJECTION
Refills: 0 | Status: DISCONTINUED | OUTPATIENT
Start: 2022-04-18 | End: 2022-04-21

## 2022-04-18 RX ORDER — CEFAZOLIN SODIUM 1 G
2000 VIAL (EA) INJECTION EVERY 8 HOURS
Refills: 0 | Status: DISCONTINUED | OUTPATIENT
Start: 2022-04-18 | End: 2022-04-20

## 2022-04-18 RX ORDER — OXYCODONE HYDROCHLORIDE 5 MG/1
5 TABLET ORAL
Refills: 0 | Status: COMPLETED | OUTPATIENT
Start: 2022-04-18 | End: 2022-04-25

## 2022-04-18 RX ORDER — TETANUS TOXOID, REDUCED DIPHTHERIA TOXOID AND ACELLULAR PERTUSSIS VACCINE, ADSORBED 5; 2.5; 8; 8; 2.5 [IU]/.5ML; [IU]/.5ML; UG/.5ML; UG/.5ML; UG/.5ML
0.5 SUSPENSION INTRAMUSCULAR ONCE
Refills: 0 | Status: DISCONTINUED | OUTPATIENT
Start: 2022-04-18 | End: 2022-04-21

## 2022-04-18 RX ORDER — ACETAMINOPHEN 500 MG
975 TABLET ORAL
Refills: 0 | Status: DISCONTINUED | OUTPATIENT
Start: 2022-04-18 | End: 2022-04-21

## 2022-04-18 RX ORDER — OXYCODONE HYDROCHLORIDE 5 MG/1
5 TABLET ORAL ONCE
Refills: 0 | Status: DISCONTINUED | OUTPATIENT
Start: 2022-04-18 | End: 2022-04-21

## 2022-04-18 RX ORDER — IBUPROFEN 200 MG
600 TABLET ORAL EVERY 6 HOURS
Refills: 0 | Status: COMPLETED | OUTPATIENT
Start: 2022-04-18 | End: 2023-03-17

## 2022-04-18 RX ORDER — DIPHENHYDRAMINE HCL 50 MG
25 CAPSULE ORAL EVERY 6 HOURS
Refills: 0 | Status: DISCONTINUED | OUTPATIENT
Start: 2022-04-18 | End: 2022-04-21

## 2022-04-18 RX ORDER — CITRIC ACID/SODIUM CITRATE 300-500 MG
15 SOLUTION, ORAL ORAL ONCE
Refills: 0 | Status: COMPLETED | OUTPATIENT
Start: 2022-04-18 | End: 2022-04-18

## 2022-04-18 RX ORDER — CEFAZOLIN SODIUM 1 G
2000 VIAL (EA) INJECTION ONCE
Refills: 0 | Status: COMPLETED | OUTPATIENT
Start: 2022-04-18 | End: 2022-04-18

## 2022-04-18 RX ORDER — HYDROMORPHONE HYDROCHLORIDE 2 MG/ML
30 INJECTION INTRAMUSCULAR; INTRAVENOUS; SUBCUTANEOUS
Refills: 0 | Status: DISCONTINUED | OUTPATIENT
Start: 2022-04-18 | End: 2022-04-19

## 2022-04-18 RX ORDER — SODIUM CHLORIDE 9 MG/ML
1000 INJECTION, SOLUTION INTRAVENOUS
Refills: 0 | Status: DISCONTINUED | OUTPATIENT
Start: 2022-04-18 | End: 2022-04-21

## 2022-04-18 RX ORDER — FAMOTIDINE 10 MG/ML
20 INJECTION INTRAVENOUS ONCE
Refills: 0 | Status: COMPLETED | OUTPATIENT
Start: 2022-04-18 | End: 2022-04-18

## 2022-04-18 RX ADMIN — SODIUM CHLORIDE 125 MILLILITER(S): 9 INJECTION, SOLUTION INTRAVENOUS at 14:57

## 2022-04-18 RX ADMIN — HYDROMORPHONE HYDROCHLORIDE 30 MILLILITER(S): 2 INJECTION INTRAMUSCULAR; INTRAVENOUS; SUBCUTANEOUS at 15:29

## 2022-04-18 RX ADMIN — Medication 1000 MILLIUNIT(S)/MIN: at 14:53

## 2022-04-18 RX ADMIN — Medication 30 MILLIGRAM(S): at 20:29

## 2022-04-18 RX ADMIN — Medication 30 MILLIGRAM(S): at 21:00

## 2022-04-18 RX ADMIN — Medication 15 MILLILITER(S): at 10:58

## 2022-04-18 RX ADMIN — Medication 975 MILLIGRAM(S): at 23:37

## 2022-04-18 RX ADMIN — Medication 100 MILLIGRAM(S): at 20:29

## 2022-04-18 RX ADMIN — FAMOTIDINE 20 MILLIGRAM(S): 10 INJECTION INTRAVENOUS at 10:58

## 2022-04-18 RX ADMIN — HYDROMORPHONE HYDROCHLORIDE 30 MILLILITER(S): 2 INJECTION INTRAMUSCULAR; INTRAVENOUS; SUBCUTANEOUS at 16:16

## 2022-04-18 RX ADMIN — SODIUM CHLORIDE 2000 MILLILITER(S): 9 INJECTION, SOLUTION INTRAVENOUS at 10:58

## 2022-04-18 RX ADMIN — SENNA PLUS 2 TABLET(S): 8.6 TABLET ORAL at 23:37

## 2022-04-18 NOTE — OB RN INTRAOPERATIVE NOTE - NS_POSTSURGSKINOTHER_OBGYN_ALL_OB_FT
MA received return call from patient. MA informed patient of new date and time for procedure and the prep has been called in. MA advised patient to keep prep on hand as if office gets a cancellation may be able to move scope sooner. Pt verbalized understanding.          Electronically signed by Khanh Contreras MA on 3/31/21 at 1:03 PM EDT approximated

## 2022-04-18 NOTE — OB RN DELIVERY SUMMARY - NS_SEPSISRSKCALC_OBGYN_ALL_OB_FT
EOS calculated successfully. EOS Risk Factor: 0.5/1000 live births ([facility] national incidence); GA=39w3d; Temp=97.9; ROM=0.017; GBS='Positive'; Antibiotics='No antibiotics or any antibiotics < 2 hrs prior to birth'

## 2022-04-18 NOTE — OB PROVIDER DELIVERY SUMMARY - NSPROVIDERDELIVERYNOTE_OBGYN_ALL_OB_FT
Viable Male Infant in Cephalic Presentation,  Apgars 8-9.  Grossly normal Uterus/Tubes/Ovaries.  TXA given pre-op as GA was given.  IVF:  2 liters  EBL:  800 cc  QBL:  643  cc  UO:  125 cc    ANITA Barrera

## 2022-04-18 NOTE — PRE-ANESTHESIA EVALUATION ADULT - NSANTHPMHFT_GEN_ALL_CORE
H/o spina bifida with bilateral foot drop - neuraxial anesthesia contraindicated.   Moderate to Severe MR

## 2022-04-18 NOTE — OB PROVIDER H&P - NSICDXPASTMEDICALHX_GEN_ALL_CORE_FT
PAST MEDICAL HISTORY:  Bilateral foot-drop wears AFO foot brace    Breast fibroadenoma, left     S/P D&C (status post dilation and curettage)     Spina bifida diagnosed in 1999    Urinary retention self-catheterizes several times a day

## 2022-04-18 NOTE — OB PROVIDER H&P - NSHPPHYSICALEXAM_GEN_ALL_CORE
Heart:  NSR  Lungs:  Teagan. Clear  Abd:  soft, NT, gravid uterus  LE:  wearing lower leg braces  Back:  Spina-Bifida surgical scar well-healed

## 2022-04-18 NOTE — OB PROVIDER H&P - ASSESSMENT
36 y.o. B Female  EDC 22 IUP @ 39 3/7 wks. gest., (+) GBS, who presents for C/S under GA due to Spina Bifida in Lumbo-Sacral Spine & pt. is not cleared for Neuraxial anesthesia. (+) FM.  (-) Ctx.  (-) Vaginal Bleeding/Fluld.  Pt. has had spinal surgery x 2.  Pt. does self-catheterization, wears leg braces, & ambulates with a cane.  Pt. had Anesthesia Clearance.  Pt. had CARDS Clearance by Dr. Oliveros.  Pt. with mod-severe MR with nl pulmonary pressure.  3/29/22 EKG - Sinus Tachycardia.  Pt. on Baby ASA.  Pt. NPO since mdnt but just finished Gatorade (as instructed).  Pt. had PNC initially with OB group @ [facility] & had transfer of care @ ~ 30 wks. gest.  22 - COVID - Negative  EFW 3600 gms.

## 2022-04-18 NOTE — OB NEONATOLOGY/PEDIATRICIAN DELIVERY SUMMARY - NS_BIRTHTRAUMAOTHERA_OBGYN_ALL_OB_FT
B/l hip laxity, no hip clicks. B/l clicks of the knee joints. <0.25cm flat, circular pigmented birthmark superior to left clavicle

## 2022-04-18 NOTE — OB RN PATIENT PROFILE - FALL HARM RISK - UNIVERSAL INTERVENTIONS
Bed in lowest position, wheels locked, appropriate side rails in place/Call bell, personal items and telephone in reach/Instruct patient to call for assistance before getting out of bed or chair/Non-slip footwear when patient is out of bed/[place] to call system/Physically safe environment - no spills, clutter or unnecessary equipment/Purposeful Proactive Rounding/Room/bathroom lighting operational, light cord in reach

## 2022-04-18 NOTE — OB PROVIDER H&P - NS_OBGYNHISTORY_OBGYN_ALL_OB_FT
POBHx:  Denies    PGynHx:  Endometrial Polyp, s/p D&C/Polypectomy                Infertility due to low ovarian reserve & low sperm motility.  This is an IVF pregnancy with pt. 'v egg with partner's sperm.                Left Breast Fibroadenoma, s/p excision.

## 2022-04-18 NOTE — OB RN PATIENT PROFILE - NSMATERNALFETALCONCERNS_OBGYN_ALL_OB_FT
Maternal/Fetal Alert  4/8/22 - Maternal MDM held on 4/6/22.  Maternal spina bifida, followed by Dr. Piyush Mccartney neurosurgery.  Undergoes self cateterization. Walks with a cane.  Consider PT consult given increased fall risk.  Neuraxial anesthesia contraindicated.  Moderate to severe MR with normal pulmonary pressures.  Followed by Dr. Oliveros.   Fetal - Fetl echo - mild to moderate tricuspid regurgitation, mildly dilated RA and RV.  Follow up with peds cardiology 2-4 weeks post delivery. -Nadira Lund RNC

## 2022-04-18 NOTE — OB RN INTRAOPERATIVE NOTE - NSSELHIDDEN_OBGYN_ALL_OB_FT
[NS_DeliveryAttending2_OBGYN_ALL_OB_FT:[id number]=],[NS_DeliveryAssist1_OBGYN_ALL_OB_FT:[id number]],[NS_DeliveryAttending1_OBGYN_ALL_OB_FT:[email or web address]=],[NS_DeliveryRN_OBGYN_ALL_OB_FT:[id number]],[NS_CirculateRN2_OBGYN_ALL_OB_FT:[id number]=]

## 2022-04-18 NOTE — OB RN DELIVERY SUMMARY - NSSELHIDDEN_OBGYN_ALL_OB_FT
[NS_DeliveryAttending2_OBGYN_ALL_OB_FT:[email or web address]=],[NS_DeliveryAssist1_OBGYN_ALL_OB_FT:[id number]],[NS_DeliveryAttending1_OBGYN_ALL_OB_FT:[email or web address]=],[NS_DeliveryRN_OBGYN_ALL_OB_FT:[id number]],[NS_CirculateRN2_OBGYN_ALL_OB_FT:[id number]=]

## 2022-04-18 NOTE — OB PROVIDER DELIVERY SUMMARY - NSSELHIDDEN_OBGYN_ALL_OB_FT
[NS_DeliveryAttending2_OBGYN_ALL_OB_FT:[id number]=],[NS_DeliveryAssist1_OBGYN_ALL_OB_FT:[id number]],[NS_DeliveryAttending1_OBGYN_ALL_OB_FT:[email or web address]=]

## 2022-04-18 NOTE — OB PROVIDER H&P - PROBLEM SELECTOR PLAN 1
Admit to L&D  NPO  IVF  BR  EFM/TOCO   Anesthesia consult  Abd prep  Batista to BSD  C/S under GA  PAS  Routine labs    E.ANITA Camarillo  as per Dr. Crowder

## 2022-04-18 NOTE — OB NEONATOLOGY/PEDIATRICIAN DELIVERY SUMMARY - NSPEDSNEONOTESA_OBGYN_ALL_OB_FT
Requested by OB to attend this primary  at 39.3 weeks, performed under general anesthesia. Mother is a 36year old, G 1P0, blood type O pos.  Prenatal labs as follow: HIV neg, RPR non-reactive, rubella immune, HBsA neg, GBS neg on 3/28/22. Maternal history significant for spina bifida, followed by Dr. Piyush Mccartney neurosurgery.  Undergoes self catheterization. Walks with a cane.  No significant prenatal history.  This pregnancy was complicated by maternal factors as stated.  AROM at delivery clear fluid.  Infant emerged vertex, vigorous, with good tone. Infnat brought to warmer. Dried, suctioned and stimulated.  Apgars 8/9. Mom wishes to breast feed. Consents to Hep B vaccine. Declines circumcision. Infant admitted to NBN for routine care. Parents updated.

## 2022-04-18 NOTE — OB PROVIDER H&P - HISTORY OF PRESENT ILLNESS
36 y.o. B Female  EDC 22 IUP @ 39 3/7 wks. gest., (+) GBS, who presents for C/S under GA due to Spina Bifida in L-Sacral Spine & pt. is not cleared for Neuraxial anesthesia. (+) FM.  (-) Ctx.  (-) Vaginal Bleeding/Fluld.  Pt. has had spinal surgery x 2.  Pt. does self-catheterization, wears leg braces, & ambulates with a cane.  Pt. had Anesthesia Clearance.  Pt. had CARDS Clearance by Dr. Oliveros.  Pt. with mod-severe MR with nl pulmonary pressure.  3/29/22 EKG - Sinus Tachycardia.  Pt. on Baby ASA.  Pt. NPO since mdnt but just finished Gatorade (as instructed).  Pt. had PNC initially with OB group @ [facility] & had transfer of care @ ~ 30 wks. gest.  22 - COVID - Negative  EFW 3600 gms.    Fetal ECHO:  mild. to mod. tricuspid regurgitation.  Mildly dilated RA & RV.  Baby needs to be followed in2-4 wks.    POBHx:  Denies    PGynHx:  Endometrial Polyp, s/p D&C/Polypectomy                Infertility due to low ovarian reserve & low sperm motility.  This is an IVF pregnancy with pt. 'v egg with partner's sperm.                Left Breast Fibroadenoma, s/p excision.    PMHx;  Lumbosacral Spina Bifida, s/p surgery x 2, performs self-catheterization for urinary incontinence,  wears leg braces for foot drop, & utilizes cane for ambulation.  NEUROSURG:  Dr. Piyush Mccartney    PSHx:   &  Spina Bifida surgery             Left Breast Fibroadenoma Excision in her early 20's             2019 D&C/Polypectomy               Meds:  PNV 1 p.o. daily            Baby ASA 1 p.o. daily    Allergy:  Macrobid-sore in mouth               Doxycycline-Dizziness               Latex-Rash    SocHx:  Denies smoking tobacco/ETOH/Illegal drug use    FHx:  DM          Hypercholesterolemia          HTN

## 2022-04-19 LAB
BASOPHILS # BLD AUTO: 0 K/UL — SIGNIFICANT CHANGE UP (ref 0–0.2)
BASOPHILS NFR BLD AUTO: 0 % — SIGNIFICANT CHANGE UP (ref 0–2)
BURR CELLS BLD QL SMEAR: PRESENT — SIGNIFICANT CHANGE UP
ELLIPTOCYTES BLD QL SMEAR: SLIGHT — SIGNIFICANT CHANGE UP
EOSINOPHIL # BLD AUTO: 0 K/UL — SIGNIFICANT CHANGE UP (ref 0–0.5)
EOSINOPHIL NFR BLD AUTO: 0 % — SIGNIFICANT CHANGE UP (ref 0–6)
GIANT PLATELETS BLD QL SMEAR: PRESENT — SIGNIFICANT CHANGE UP
HCT VFR BLD CALC: 28.9 % — LOW (ref 34.5–45)
HGB BLD-MCNC: 8.8 G/DL — LOW (ref 11.5–15.5)
LYMPHOCYTES # BLD AUTO: 1.4 K/UL — SIGNIFICANT CHANGE UP (ref 1–3.3)
LYMPHOCYTES # BLD AUTO: 7.9 % — LOW (ref 13–44)
MANUAL SMEAR VERIFICATION: SIGNIFICANT CHANGE UP
MCHC RBC-ENTMCNC: 23.7 PG — LOW (ref 27–34)
MCHC RBC-ENTMCNC: 30.4 GM/DL — LOW (ref 32–36)
MCV RBC AUTO: 77.7 FL — LOW (ref 80–100)
MONOCYTES # BLD AUTO: 1.86 K/UL — HIGH (ref 0–0.9)
MONOCYTES NFR BLD AUTO: 10.5 % — SIGNIFICANT CHANGE UP (ref 2–14)
NEUTROPHILS # BLD AUTO: 14.48 K/UL — HIGH (ref 1.8–7.4)
NEUTROPHILS NFR BLD AUTO: 81.6 % — HIGH (ref 43–77)
NRBC # BLD: 1 /100 — HIGH (ref 0–0)
PLAT MORPH BLD: NORMAL — SIGNIFICANT CHANGE UP
PLATELET # BLD AUTO: 255 K/UL — SIGNIFICANT CHANGE UP (ref 150–400)
POIKILOCYTOSIS BLD QL AUTO: SIGNIFICANT CHANGE UP
POLYCHROMASIA BLD QL SMEAR: SLIGHT — SIGNIFICANT CHANGE UP
RBC # BLD: 3.72 M/UL — LOW (ref 3.8–5.2)
RBC # FLD: 16.5 % — HIGH (ref 10.3–14.5)
RBC BLD AUTO: ABNORMAL
SCHISTOCYTES BLD QL AUTO: SLIGHT — SIGNIFICANT CHANGE UP
TARGETS BLD QL SMEAR: SLIGHT — SIGNIFICANT CHANGE UP
WBC # BLD: 17.74 K/UL — HIGH (ref 3.8–10.5)
WBC # FLD AUTO: 17.74 K/UL — HIGH (ref 3.8–10.5)

## 2022-04-19 RX ORDER — ASCORBIC ACID 60 MG
500 TABLET,CHEWABLE ORAL THREE TIMES A DAY
Refills: 0 | Status: DISCONTINUED | OUTPATIENT
Start: 2022-04-19 | End: 2022-04-21

## 2022-04-19 RX ORDER — FERROUS SULFATE 325(65) MG
325 TABLET ORAL THREE TIMES A DAY
Refills: 0 | Status: DISCONTINUED | OUTPATIENT
Start: 2022-04-19 | End: 2022-04-21

## 2022-04-19 RX ORDER — HEPARIN SODIUM 5000 [USP'U]/ML
5000 INJECTION INTRAVENOUS; SUBCUTANEOUS EVERY 12 HOURS
Refills: 0 | Status: DISCONTINUED | OUTPATIENT
Start: 2022-04-19 | End: 2022-04-21

## 2022-04-19 RX ADMIN — Medication 975 MILLIGRAM(S): at 05:48

## 2022-04-19 RX ADMIN — Medication 30 MILLIGRAM(S): at 15:03

## 2022-04-19 RX ADMIN — Medication 975 MILLIGRAM(S): at 06:40

## 2022-04-19 RX ADMIN — SENNA PLUS 2 TABLET(S): 8.6 TABLET ORAL at 09:18

## 2022-04-19 RX ADMIN — Medication 30 MILLIGRAM(S): at 04:01

## 2022-04-19 RX ADMIN — Medication 975 MILLIGRAM(S): at 17:34

## 2022-04-19 RX ADMIN — Medication 100 MILLIGRAM(S): at 20:08

## 2022-04-19 RX ADMIN — Medication 975 MILLIGRAM(S): at 23:45

## 2022-04-19 RX ADMIN — Medication 30 MILLIGRAM(S): at 09:40

## 2022-04-19 RX ADMIN — Medication 975 MILLIGRAM(S): at 11:40

## 2022-04-19 RX ADMIN — POLYETHYLENE GLYCOL 3350 17 GRAM(S): 17 POWDER, FOR SOLUTION ORAL at 20:50

## 2022-04-19 RX ADMIN — Medication 975 MILLIGRAM(S): at 18:10

## 2022-04-19 RX ADMIN — Medication 500 MILLIGRAM(S): at 14:03

## 2022-04-19 RX ADMIN — Medication 100 MILLIGRAM(S): at 03:28

## 2022-04-19 RX ADMIN — SENNA PLUS 2 TABLET(S): 8.6 TABLET ORAL at 20:50

## 2022-04-19 RX ADMIN — Medication 30 MILLIGRAM(S): at 03:29

## 2022-04-19 RX ADMIN — Medication 325 MILLIGRAM(S): at 14:03

## 2022-04-19 RX ADMIN — Medication 100 MILLIGRAM(S): at 12:12

## 2022-04-19 RX ADMIN — Medication 325 MILLIGRAM(S): at 20:09

## 2022-04-19 RX ADMIN — Medication 975 MILLIGRAM(S): at 23:15

## 2022-04-19 RX ADMIN — Medication 500 MILLIGRAM(S): at 20:09

## 2022-04-19 RX ADMIN — Medication 975 MILLIGRAM(S): at 00:29

## 2022-04-19 RX ADMIN — Medication 30 MILLIGRAM(S): at 20:38

## 2022-04-19 RX ADMIN — Medication 30 MILLIGRAM(S): at 20:08

## 2022-04-19 RX ADMIN — Medication 30 MILLIGRAM(S): at 09:18

## 2022-04-19 RX ADMIN — Medication 975 MILLIGRAM(S): at 12:15

## 2022-04-19 RX ADMIN — HEPARIN SODIUM 5000 UNIT(S): 5000 INJECTION INTRAVENOUS; SUBCUTANEOUS at 17:34

## 2022-04-19 RX ADMIN — Medication 30 MILLIGRAM(S): at 15:20

## 2022-04-19 RX ADMIN — HEPARIN SODIUM 5000 UNIT(S): 5000 INJECTION INTRAVENOUS; SUBCUTANEOUS at 05:49

## 2022-04-19 NOTE — PHYSICAL THERAPY INITIAL EVALUATION ADULT - TRANSFER TRAINING, PT EVAL
GOAL: Patient will perform sit to stand transfers independently at rolling walker in 2 weeks with proper hand placement

## 2022-04-19 NOTE — CHART NOTE - NSCHARTNOTEFT_GEN_A_CORE
PA NOTE     POD#1         Vital Signs Last 24 Hrs  T(C): 36.5 (2022 05:32), Max: 36.7 (2022 15:50)  T(F): 97.7 (2022 05:32), Max: 98.1 (2022 15:50)  HR: 83 (2022 05:32) (74 - 108)  BP: 98/60 (2022 05:32) (98/60 - 124/65)  BP(mean): 87 (2022 15:50) (80 - 88)  RR: 18 (2022 05:32) (11 - 25)  SpO2: 97% (2022 05:32) (97% - 100%)               8.8    17.74 )-----------( 255      (  @ 07:18 )             28.9                11.3   11.12 )-----------( 300      (  @ 09:55 )             36.7           Assessment: Anemia secondary to acute blood loss due to delivery    Plan:  - Ferrous Sulfate, Vitamin C supplementation.  - Monitor for signs/symptoms of anemia.   - Does not require transfusion at this time

## 2022-04-19 NOTE — PHYSICAL THERAPY INITIAL EVALUATION ADULT - GENERAL OBSERVATIONS, REHAB EVAL
pt received in bed, AFO braces at bedside, +ab binder, +pain pump, +IV, +pruett, spouse, mother and infant present

## 2022-04-19 NOTE — PHYSICAL THERAPY INITIAL EVALUATION ADULT - PERTINENT HX OF CURRENT PROBLEM, REHAB EVAL
37yo with h/o spina bifida POD#1 s/p LTCS . Her pain is well controlled. She is tolerating a regular diet and passing flatus. Denies N/V. Denies CP/SOB/lightheadedness/dizziness. 37yo with h/o spina bifida POD#1 s/p LTCS. Her pain is well controlled. She is tolerating a regular diet and passing flatus. Denies N/V. Denies CP/SOB/lightheadedness/dizziness.

## 2022-04-19 NOTE — PHYSICAL THERAPY INITIAL EVALUATION ADULT - ADDITIONAL COMMENTS
pt lives in second story apartment with . Will be living with mother following hospital stay who has 3 steps to enter and one railing on left. Pt has spina bifida and used a cane to ambulate, has Bilat AFOs.

## 2022-04-20 ENCOUNTER — TRANSCRIPTION ENCOUNTER (OUTPATIENT)
Age: 37
End: 2022-04-20

## 2022-04-20 RX ORDER — IBUPROFEN 200 MG
1 TABLET ORAL
Qty: 0 | Refills: 0 | DISCHARGE
Start: 2022-04-20

## 2022-04-20 RX ORDER — IBUPROFEN 200 MG
600 TABLET ORAL EVERY 6 HOURS
Refills: 0 | Status: DISCONTINUED | OUTPATIENT
Start: 2022-04-20 | End: 2022-04-21

## 2022-04-20 RX ORDER — ASPIRIN/CALCIUM CARB/MAGNESIUM 324 MG
1 TABLET ORAL
Qty: 0 | Refills: 0 | DISCHARGE

## 2022-04-20 RX ORDER — OXYCODONE HYDROCHLORIDE 5 MG/1
5 TABLET ORAL
Refills: 0 | Status: DISCONTINUED | OUTPATIENT
Start: 2022-04-20 | End: 2022-04-21

## 2022-04-20 RX ORDER — ACETAMINOPHEN 500 MG
3 TABLET ORAL
Qty: 0 | Refills: 0 | DISCHARGE
Start: 2022-04-20

## 2022-04-20 RX ADMIN — Medication 500 MILLIGRAM(S): at 22:47

## 2022-04-20 RX ADMIN — Medication 30 MILLIGRAM(S): at 03:39

## 2022-04-20 RX ADMIN — Medication 600 MILLIGRAM(S): at 15:31

## 2022-04-20 RX ADMIN — SENNA PLUS 2 TABLET(S): 8.6 TABLET ORAL at 21:14

## 2022-04-20 RX ADMIN — Medication 975 MILLIGRAM(S): at 12:05

## 2022-04-20 RX ADMIN — Medication 975 MILLIGRAM(S): at 05:48

## 2022-04-20 RX ADMIN — Medication 975 MILLIGRAM(S): at 11:33

## 2022-04-20 RX ADMIN — OXYCODONE HYDROCHLORIDE 5 MILLIGRAM(S): 5 TABLET ORAL at 16:05

## 2022-04-20 RX ADMIN — Medication 100 MILLIGRAM(S): at 04:09

## 2022-04-20 RX ADMIN — Medication 600 MILLIGRAM(S): at 08:56

## 2022-04-20 RX ADMIN — Medication 30 MILLIGRAM(S): at 03:09

## 2022-04-20 RX ADMIN — OXYCODONE HYDROCHLORIDE 5 MILLIGRAM(S): 5 TABLET ORAL at 15:30

## 2022-04-20 RX ADMIN — OXYCODONE HYDROCHLORIDE 5 MILLIGRAM(S): 5 TABLET ORAL at 22:45

## 2022-04-20 RX ADMIN — Medication 600 MILLIGRAM(S): at 09:35

## 2022-04-20 RX ADMIN — Medication 975 MILLIGRAM(S): at 19:03

## 2022-04-20 RX ADMIN — Medication 975 MILLIGRAM(S): at 18:20

## 2022-04-20 RX ADMIN — Medication 975 MILLIGRAM(S): at 06:18

## 2022-04-20 RX ADMIN — Medication 600 MILLIGRAM(S): at 21:14

## 2022-04-20 RX ADMIN — HEPARIN SODIUM 5000 UNIT(S): 5000 INJECTION INTRAVENOUS; SUBCUTANEOUS at 05:47

## 2022-04-20 RX ADMIN — Medication 600 MILLIGRAM(S): at 16:05

## 2022-04-20 RX ADMIN — HEPARIN SODIUM 5000 UNIT(S): 5000 INJECTION INTRAVENOUS; SUBCUTANEOUS at 18:27

## 2022-04-20 NOTE — DISCHARGE NOTE OB - NS MD DC FALL RISK RISK
For information on Fall & Injury Prevention, visit: [email or web address] OR  [email or web address] OR  https://www.cdc.gov/steadi/patient.html

## 2022-04-20 NOTE — DISCHARGE NOTE OB - CARE PROVIDER_API CALL
Fabiola Crowder  OBSTETRICS AND GYNECOLOGY  [address], [address]  [place] [address]  Phone: [phone]  Fax: [phone]  Follow Up Time:

## 2022-04-20 NOTE — DISCHARGE NOTE OB - CARE PLAN
Principal Discharge DX:	 delivery delivered  Assessment and plan of treatment:	Return to baseline health, regular diet, pain control. Follow up with Dr. Crowder.   1

## 2022-04-20 NOTE — DISCHARGE NOTE OB - PATIENT PORTAL LINK FT
You can access the FollowMyHealth Patient Portal offered by [facility] by registering at the following website: [email or web address]. By joining [facility]’s FollowMyHealth portal, you will also be able to view your health information using other applications (apps) compatible with our system.

## 2022-04-21 VITALS
TEMPERATURE: 99 F | SYSTOLIC BLOOD PRESSURE: 111 MMHG | HEART RATE: 98 BPM | RESPIRATION RATE: 18 BRPM | OXYGEN SATURATION: 99 % | DIASTOLIC BLOOD PRESSURE: 72 MMHG

## 2022-04-21 PROCEDURE — 86850 RBC ANTIBODY SCREEN: CPT

## 2022-04-21 PROCEDURE — 36415 COLL VENOUS BLD VENIPUNCTURE: CPT

## 2022-04-21 PROCEDURE — 59050 FETAL MONITOR W/REPORT: CPT

## 2022-04-21 PROCEDURE — 86780 TREPONEMA PALLIDUM: CPT

## 2022-04-21 PROCEDURE — 86900 BLOOD TYPING SEROLOGIC ABO: CPT

## 2022-04-21 PROCEDURE — 97530 THERAPEUTIC ACTIVITIES: CPT

## 2022-04-21 PROCEDURE — 59025 FETAL NON-STRESS TEST: CPT

## 2022-04-21 PROCEDURE — 86769 SARS-COV-2 COVID-19 ANTIBODY: CPT

## 2022-04-21 PROCEDURE — 86901 BLOOD TYPING SEROLOGIC RH(D): CPT

## 2022-04-21 PROCEDURE — 97116 GAIT TRAINING THERAPY: CPT

## 2022-04-21 PROCEDURE — 85025 COMPLETE CBC W/AUTO DIFF WBC: CPT

## 2022-04-21 RX ADMIN — Medication 600 MILLIGRAM(S): at 09:39

## 2022-04-21 RX ADMIN — Medication 975 MILLIGRAM(S): at 05:47

## 2022-04-21 RX ADMIN — Medication 600 MILLIGRAM(S): at 10:10

## 2022-04-21 RX ADMIN — Medication 600 MILLIGRAM(S): at 02:40

## 2022-04-21 RX ADMIN — HEPARIN SODIUM 5000 UNIT(S): 5000 INJECTION INTRAVENOUS; SUBCUTANEOUS at 05:47

## 2022-04-21 RX ADMIN — Medication 975 MILLIGRAM(S): at 13:04

## 2022-04-21 RX ADMIN — Medication 500 MILLIGRAM(S): at 05:47

## 2022-04-21 RX ADMIN — OXYCODONE HYDROCHLORIDE 5 MILLIGRAM(S): 5 TABLET ORAL at 11:10

## 2022-04-21 RX ADMIN — OXYCODONE HYDROCHLORIDE 5 MILLIGRAM(S): 5 TABLET ORAL at 10:40

## 2022-04-21 RX ADMIN — Medication 975 MILLIGRAM(S): at 13:35

## 2022-04-21 RX ADMIN — Medication 500 MILLIGRAM(S): at 13:04

## 2022-04-21 RX ADMIN — Medication 325 MILLIGRAM(S): at 13:04

## 2022-04-21 RX ADMIN — Medication 975 MILLIGRAM(S): at 00:21

## 2022-04-21 NOTE — PROGRESS NOTE ADULT - SUBJECTIVE AND OBJECTIVE BOX
Day 1 of Anesthesia Pain Management Service    SUBJECTIVE: Patient is doing well with IV PCA    Pain Scale Score:	[X] Refer to charted pain scores    THERAPY:    [ ] IV PCA Morphine		[ ] 5 mg/mL	[ ] 1 mg/mL  [X] IV PCA Hydromorphone	[ ] 5 mg/mL	[X] 1 mg/mL  [ ] IV PCA Fentanyl		[ ] 50 micrograms/mL    Demand dose: 0.2 mg     Lockout: 6 minutes   Continuous Rate: 0 mg/hr  4 Hour Limit: 4 mg    MEDICATIONS  (STANDING):  acetaminophen     Tablet .. 975 milliGRAM(s) Oral <User Schedule>  ascorbic acid 500 milliGRAM(s) Oral three times a day  ceFAZolin   IVPB      ceFAZolin   IVPB      ceFAZolin   IVPB 2000 milliGRAM(s) IV Intermittent every 8 hours  diphtheria/tetanus/pertussis (acellular) Vaccine (ADAcel) 0.5 milliLiter(s) IntraMuscular once  ferrous    sulfate 325 milliGRAM(s) Oral three times a day  heparin   Injectable 5000 Unit(s) SubCutaneous every 12 hours  HYDROmorphone PCA (1 mG/mL) 30 milliLiter(s) PCA Continuous PCA Continuous  ibuprofen  Tablet. 600 milliGRAM(s) Oral every 6 hours  ketorolac   Injectable 30 milliGRAM(s) IV Push every 6 hours  lactated ringers. 1000 milliLiter(s) (125 mL/Hr) IV Continuous <Continuous>  oxytocin Infusion 333.333 milliUNIT(s)/Min (1000 mL/Hr) IV Continuous <Continuous>    MEDICATIONS  (PRN):  diphenhydrAMINE 25 milliGRAM(s) Oral every 6 hours PRN Pruritus  HYDROmorphone  Injectable 0.5 milliGRAM(s) IV Push every 10 minutes PRN Moderate Pain (4 - 6)  HYDROmorphone  Injectable 1 milliGRAM(s) IV Push every 10 minutes PRN Severe Pain (7 - 10)  HYDROmorphone PCA (1 mG/mL) Rescue Clinician Bolus 0.5 milliGRAM(s) IV Push every 15 minutes PRN for Pain Scale GREATER THAN 6  lanolin Ointment 1 Application(s) Topical every 6 hours PRN Sore Nipples  magnesium hydroxide Suspension 30 milliLiter(s) Oral two times a day PRN Constipation  naloxone Injectable 0.1 milliGRAM(s) IV Push every 3 minutes PRN For ANY of the following changes in patient status:  A. RR LESS THAN 10 breaths per minute, B. Oxygen saturation LESS THAN 90%, C. Sedation score of 6  ondansetron Injectable 4 milliGRAM(s) IV Push every 6 hours PRN Nausea  oxyCODONE    IR 5 milliGRAM(s) Oral every 3 hours PRN Moderate to Severe Pain (4-10)  oxyCODONE    IR 5 milliGRAM(s) Oral once PRN Moderate to Severe Pain (4-10)  polyethylene glycol 3350 17 Gram(s) Oral every 12 hours PRN Constipation  senna 2 Tablet(s) Oral two times a day PRN Constipation  simethicone 80 milliGRAM(s) Chew every 4 hours PRN Gas      OBJECTIVE:    Sedation Score:	[ X] Alert	[ ] Drowsy 	[ ] Arousable	[ ] Asleep	[ ] Unresponsive    Side Effects:	[X ] None	[ ] Nausea	[ ] Vomiting	[ ] Pruritus  		[ ] Other:    Vital Signs Last 24 Hrs  T(C): 29.4 (19 Apr 2022 10:18), Max: 36.7 (18 Apr 2022 15:50)  T(F): 85 (19 Apr 2022 10:18), Max: 98.1 (18 Apr 2022 15:50)  HR: 85 (19 Apr 2022 10:18) (74 - 96)  BP: 102/64 (19 Apr 2022 10:18) (98/60 - 124/65)  BP(mean): 87 (18 Apr 2022 15:50) (80 - 88)  RR: 18 (19 Apr 2022 10:18) (11 - 25)  SpO2: 98% (19 Apr 2022 10:18) (97% - 100%)    ASSESSMENT/ PLAN    Therapy to  be:               [ ] Continued   [X] Discontinued   [ ] Changed to PRN Analgesics    Documentation and Verification of current medications:   [X] Done	[ ] Not done, not eligible    Comments:
OB Progress Note: LTCS, POD#2    S: 37yo POD#2 s/p pLTCS. Patient has past medical history of spina bifida. Pain is well controlled. She is tolerating a regular diet and passing flatus. Batista in situ secondary to spina bifida. Denies CP/SOB. Denies lightheadedness/dizziness. Denies N/V.    O:  Vitals:  Vital Signs Last 24 Hrs  T(C): 36.4 (20 Apr 2022 04:48), Max: 37 (19 Apr 2022 21:00)  T(F): 97.5 (20 Apr 2022 04:48), Max: 98.6 (19 Apr 2022 21:00)  HR: 88 (20 Apr 2022 04:48) (69 - 88)  BP: 115/75 (20 Apr 2022 04:48) (102/64 - 129/91)  BP(mean): --  RR: 18 (20 Apr 2022 04:48) (18 - 18)  SpO2: 98% (20 Apr 2022 04:48) (97% - 99%)    MEDICATIONS  (STANDING):  acetaminophen     Tablet .. 975 milliGRAM(s) Oral <User Schedule>  ascorbic acid 500 milliGRAM(s) Oral three times a day  ceFAZolin   IVPB      ceFAZolin   IVPB      ceFAZolin   IVPB 2000 milliGRAM(s) IV Intermittent every 8 hours  diphtheria/tetanus/pertussis (acellular) Vaccine (ADAcel) 0.5 milliLiter(s) IntraMuscular once  ferrous    sulfate 325 milliGRAM(s) Oral three times a day  heparin   Injectable 5000 Unit(s) SubCutaneous every 12 hours  ibuprofen  Tablet. 600 milliGRAM(s) Oral every 6 hours  ketorolac   Injectable 30 milliGRAM(s) IV Push every 6 hours  lactated ringers. 1000 milliLiter(s) (125 mL/Hr) IV Continuous <Continuous>  oxytocin Infusion 333.333 milliUNIT(s)/Min (1000 mL/Hr) IV Continuous <Continuous>      MEDICATIONS  (PRN):  diphenhydrAMINE 25 milliGRAM(s) Oral every 6 hours PRN Pruritus  lanolin Ointment 1 Application(s) Topical every 6 hours PRN Sore Nipples  magnesium hydroxide Suspension 30 milliLiter(s) Oral two times a day PRN Constipation  oxyCODONE    IR 5 milliGRAM(s) Oral every 3 hours PRN Moderate to Severe Pain (4-10)  oxyCODONE    IR 5 milliGRAM(s) Oral once PRN Moderate to Severe Pain (4-10)  polyethylene glycol 3350 17 Gram(s) Oral every 12 hours PRN Constipation  senna 2 Tablet(s) Oral two times a day PRN Constipation  simethicone 80 milliGRAM(s) Chew every 4 hours PRN Gas      Labs:  Blood type: O Positive  Rubella IgG: RPR: Negative                          8.8<L>   17.74<H> >-----------< 255    ( 04-19 @ 07:18 )             28.9<L>                        11.3<L>   11.12<H> >-----------< 300    ( 04-18 @ 09:55 )             36.7        PE:  General: NAD  Abdomen: Soft, appropriately tender, incision c/d/i.  Extremities: No erythema, no pitting edema    
OB Postpartum Note:  Delivery, POD#3    S: 35yo with spina bifda POD#3 s/p LTCS. The patient feels well.  Pain is well controlled. She is tolerating a regular diet and passing flatus. Denies CP/SOB. Denies lightheadedness/dizziness. Denies N/V.    O:  Vitals:  Vital Signs Last 24 Hrs  T(C): 37 (2022 04:46), Max: 37.1 (2022 17:00)  T(F): 98.6 (2022 04:46), Max: 98.8 (2022 17:00)  HR: 98 (2022 04:46) (84 - 100)  BP: 111/72 (2022 04:46) (111/72 - 122/77)  BP(mean): --  RR: 18 (2022 04:46) (18 - 18)  SpO2: 99% (2022 04:46) (98% - 100%)    MEDICATIONS  (STANDING):  acetaminophen     Tablet .. 975 milliGRAM(s) Oral <User Schedule>  ascorbic acid 500 milliGRAM(s) Oral three times a day  ceFAZolin   IVPB      diphtheria/tetanus/pertussis (acellular) Vaccine (ADAcel) 0.5 milliLiter(s) IntraMuscular once  ferrous    sulfate 325 milliGRAM(s) Oral three times a day  heparin   Injectable 5000 Unit(s) SubCutaneous every 12 hours  ibuprofen  Tablet. 600 milliGRAM(s) Oral every 6 hours  lactated ringers. 1000 milliLiter(s) (125 mL/Hr) IV Continuous <Continuous>  oxytocin Infusion 333.333 milliUNIT(s)/Min (1000 mL/Hr) IV Continuous <Continuous>    MEDICATIONS  (PRN):  diphenhydrAMINE 25 milliGRAM(s) Oral every 6 hours PRN Pruritus  lanolin Ointment 1 Application(s) Topical every 6 hours PRN Sore Nipples  magnesium hydroxide Suspension 30 milliLiter(s) Oral two times a day PRN Constipation  oxyCODONE    IR 5 milliGRAM(s) Oral once PRN Moderate to Severe Pain (4-10)  oxyCODONE    IR 5 milliGRAM(s) Oral every 3 hours PRN Moderate to Severe Pain (4-10)  polyethylene glycol 3350 17 Gram(s) Oral every 12 hours PRN Constipation  senna 2 Tablet(s) Oral two times a day PRN Constipation  simethicone 80 milliGRAM(s) Chew every 4 hours PRN Gas      LABS:  Blood type: O Positive  Rubella IgG: RPR: Negative                          8.8<L>   17.74<H> >-----------< 255    (  @ 07:18 )             28.9<L>                        11.3<L>   11.12<H> >-----------< 300    (  @ 09:55 )             36.7                  Physical exam:  Gen: NAD  Abdomen: Soft, nontender, no distension , firm uterine fundus at umbilicus.  Incision: Clean, dry, and intact   Pelvic: Normal lochia noted  Ext: No calf tenderness      
OB Progress Note:  Delivery, POD#1    S: 37yo with h/o spina bifida POD#1 s/p LTCS . Her pain is well controlled. She is tolerating a regular diet and passing flatus. Denies N/V. Denies CP/SOB/lightheadedness/dizziness.   Batista in place.     O:   Vital Signs Last 24 Hrs  T(C): 36.7 (2022 00:57), Max: 36.7 (2022 15:50)  T(F): 98.1 (2022 00:57), Max: 98.1 (2022 15:50)  HR: 83 (2022 00:57) (74 - 108)  BP: 100/60 (2022 00:57) (100/60 - 124/65)  BP(mean): 87 (2022 15:50) (80 - 88)  RR: 18 (2022 00:57) (11 - 25)  SpO2: 98% (2022 00:57) (98% - 100%)    Labs:  Blood type: O Positive  Rubella IgG: RPR: Negative                          11.3<L>   11.12<H> >-----------< 300    ( 18 @ 09:55 )             36.7                  acetaminophen     Tablet .. 975 milliGRAM(s) Oral <User Schedule>  ceFAZolin   IVPB      ceFAZolin   IVPB      ceFAZolin   IVPB 2000 milliGRAM(s) IV Intermittent every 8 hours  diphenhydrAMINE 25 milliGRAM(s) Oral every 6 hours PRN  diphtheria/tetanus/pertussis (acellular) Vaccine (ADAcel) 0.5 milliLiter(s) IntraMuscular once  heparin   Injectable 5000 Unit(s) SubCutaneous every 12 hours  HYDROmorphone  Injectable 0.5 milliGRAM(s) IV Push every 10 minutes PRN  HYDROmorphone  Injectable 1 milliGRAM(s) IV Push every 10 minutes PRN  HYDROmorphone PCA (1 mG/mL) 30 milliLiter(s) PCA Continuous PCA Continuous  HYDROmorphone PCA (1 mG/mL) Rescue Clinician Bolus 0.5 milliGRAM(s) IV Push every 15 minutes PRN  ibuprofen  Tablet. 600 milliGRAM(s) Oral every 6 hours  ketorolac   Injectable 30 milliGRAM(s) IV Push every 6 hours  lactated ringers. 1000 milliLiter(s) IV Continuous <Continuous>  lanolin Ointment 1 Application(s) Topical every 6 hours PRN  magnesium hydroxide Suspension 30 milliLiter(s) Oral two times a day PRN  naloxone Injectable 0.1 milliGRAM(s) IV Push every 3 minutes PRN  ondansetron Injectable 4 milliGRAM(s) IV Push every 6 hours PRN  oxyCODONE    IR 5 milliGRAM(s) Oral every 3 hours PRN  oxyCODONE    IR 5 milliGRAM(s) Oral once PRN  oxytocin Infusion 333.333 milliUNIT(s)/Min IV Continuous <Continuous>  polyethylene glycol 3350 17 Gram(s) Oral every 12 hours PRN  senna 2 Tablet(s) Oral two times a day PRN  simethicone 80 milliGRAM(s) Chew every 4 hours PRN      PE:  General: NAD  Abdomen: Mildly distended, appropriately tender, incision c/d/i.  Extremities: No erythema, no pitting edema    
normal...

## 2022-04-21 NOTE — PROGRESS NOTE ADULT - ASSESSMENT
A/P: 37yo with h/o spina bifida POD#1 s/p LTCS.  Patient is stable and doing well post-operatively.    - Remove Batista when able, pt will return to intermittent self catheterization   - Continue regular diet.  - Increase ambulation.  - Continue motrin, tylenol, oxycodone PRN for pain control  - F/u AM CBC    MELYSSA Gonzalez  PGY-1
A/P: 35yo  POD#3 s/p LTCS.  Patient is stable and is doing well post-operatively.  - c/w intermittent self catheterization  - Continue motrin, tylenol, oxycodone PRN for pain control.  - Increase ambulation  - Continue regular diet  - Discharge planning    MELYSSA Gonzalez, PGY-1   
A/P: 35yo POD#2 s/p pLTCS.  Patient is stable and doing well post-operatively.      spina bifida  -pruett to be removed this morning, patient to self-catheterized  -ancef discontinued this AM (patient received 48 hours)    post partum  - Continue regular diet.  - Increase ambulation.  - Continue motrin, tylenol, oxycodone PRN for pain control.     Lolly Llanes MD PGY1

## 2022-05-26 PROBLEM — Z98.890 OTHER SPECIFIED POSTPROCEDURAL STATES: Chronic | Status: ACTIVE | Noted: 2022-04-18

## 2022-06-17 ENCOUNTER — APPOINTMENT (OUTPATIENT)
Dept: NEUROSURGERY | Facility: CLINIC | Age: 37
End: 2022-06-17
Payer: COMMERCIAL

## 2022-06-17 VITALS — BODY MASS INDEX: 22.66 KG/M2 | WEIGHT: 141 LBS | OXYGEN SATURATION: 96 % | HEIGHT: 66 IN | HEART RATE: 81 BPM

## 2022-06-17 DIAGNOSIS — R39.81 FUNCTIONAL URINARY INCONTINENCE: ICD-10-CM

## 2022-06-17 DIAGNOSIS — Q05.9 SPINA BIFIDA, UNSPECIFIED: ICD-10-CM

## 2022-06-17 DIAGNOSIS — G95.0 SYRINGOMYELIA AND SYRINGOBULBIA: ICD-10-CM

## 2022-06-17 DIAGNOSIS — M51.36 OTHER INTERVERTEBRAL DISC DEGENERATION, LUMBAR REGION: ICD-10-CM

## 2022-06-17 PROCEDURE — 99203 OFFICE O/P NEW LOW 30 MIN: CPT

## 2022-06-17 RX ORDER — ASPIRIN 81 MG
81 TABLET, DELAYED RELEASE (ENTERIC COATED) ORAL
Refills: 0 | Status: DISCONTINUED | COMMUNITY
End: 2022-06-17

## 2022-06-17 RX ORDER — FOLIC ACID 20 MG
CAPSULE ORAL
Refills: 0 | Status: DISCONTINUED | COMMUNITY
End: 2022-06-17

## 2022-06-17 NOTE — PHYSICAL EXAM
[General Appearance - Alert] : alert [General Appearance - In No Acute Distress] : in no acute distress [Oriented To Time, Place, And Person] : oriented to person, place, and time [Impaired Insight] : insight and judgment were intact [Affect] : the affect was normal [1+] : Patella left 1+ [Antalgic] : antalgic [Pain / Temp Decrease Lateral Leg & Dorsum Of Foot Right Only] : L5 sensory impairment [Pain / Temp Decrease Sole Of Foot & Posterior Leg Left Only] : S1 sensory impairment [Pain / Temp Decrease Lateral Leg & Dorsum Of Foot Left Only] : L5 sensory impairment [Pain / Temp Decrease Sole Of Foot & Posterior Leg Bilateral] : S1 sensory impairment [5] : 5/5 Knee Extensor (L3) [2] : 2/5 Ankle Plantar Flexion (S1)

## 2022-06-17 NOTE — REVIEW OF SYSTEMS
[As Noted in HPI] : as noted in HPI [Leg Weakness] : leg weakness [Numbness] : numbness [Tingling] : tingling [Abnormal Sensation] : an abnormal sensation [Negative] : Heme/Lymph

## 2022-06-20 PROBLEM — G95.0 SYRINGOMYELIA: Status: ACTIVE | Noted: 2022-06-20

## 2022-06-20 NOTE — HISTORY OF PRESENT ILLNESS
[FreeTextEntry1] : hx spina bifida [de-identified] : Mrs. Hawley is a 35 y/o, female, here to establish care as a new patient due to hx of spina bifida. She had surgery for spina bifida as a teenager in 1999 f/b second surgery 2000's by . After second surgery she lost her bladder function and has to self cath since. She has difficulty walking due to weakness of b/l legs. She has numbness of b/l feet. She recently had a child and underwent C.Section. She had a recent MRI spine and was advised to undergo repeat surgery by . She is going to be starting PT next week for strengthening of LE/gait improvement\par

## 2022-06-20 NOTE — ASSESSMENT
[FreeTextEntry1] : Mrs. Hawley is a 37 y/o, female, with hx of spina bifida s/p surgery x 2, with urinary incontinence (self cath) and b/l LE weakness which she states has not really changed. Pt to continue using brace for legs. Will begin PT soon at [facility]. She will get a standing xray to see the over all balance of the spine.

## 2022-08-23 NOTE — ASU PREOP CHECKLIST - 3.
3rd and Final attempt to contact patient for scheduling. No answer, left another message. Also, sending a unable to contact letter to patient via [place] mail.   given warm blanket

## 2022-08-25 ENCOUNTER — APPOINTMENT (OUTPATIENT)
Dept: CARDIOLOGY | Facility: CLINIC | Age: 37
End: 2022-08-25

## 2022-08-25 PROCEDURE — 93306 TTE W/DOPPLER COMPLETE: CPT

## 2022-08-30 ENCOUNTER — APPOINTMENT (OUTPATIENT)
Dept: CARDIOLOGY | Facility: CLINIC | Age: 37
End: 2022-08-30

## 2022-08-30 VITALS — WEIGHT: 123 LBS | BODY MASS INDEX: 19.85 KG/M2

## 2022-08-30 VITALS
SYSTOLIC BLOOD PRESSURE: 125 MMHG | DIASTOLIC BLOOD PRESSURE: 80 MMHG | HEIGHT: 66 IN | HEART RATE: 76 BPM | OXYGEN SATURATION: 98 %

## 2022-08-30 DIAGNOSIS — I34.0 NONRHEUMATIC MITRAL (VALVE) INSUFFICIENCY: ICD-10-CM

## 2022-08-30 PROCEDURE — 93000 ELECTROCARDIOGRAM COMPLETE: CPT

## 2022-08-30 PROCEDURE — 99214 OFFICE O/P EST MOD 30 MIN: CPT | Mod: 25

## 2022-08-31 ENCOUNTER — NON-APPOINTMENT (OUTPATIENT)
Age: 37
End: 2022-08-31

## 2022-08-31 PROBLEM — I34.0 MITRAL REGURGITATION: Status: ACTIVE | Noted: 2022-03-29

## 2022-08-31 NOTE — DISCUSSION/SUMMARY
[FreeTextEntry1] : The patient is a 36-year-old female spina bifida, s/p delivery who is feeling well \par #1 CV- normal LV function, moderate to severe MR with normal pulmonary pressures during pregnancy, recent f/u ECHO mild to mod MR\par #2 Neuro- no candidate for epidural or spinal as she has spina bifida, ambulates with cane\par #3 Ob- s/p delivery doing well\par  [EKG obtained to assist in diagnosis and management of assessed problem(s)] : EKG obtained to assist in diagnosis and management of assessed problem(s)

## 2022-09-15 ENCOUNTER — APPOINTMENT (OUTPATIENT)
Dept: NEUROSURGERY | Facility: CLINIC | Age: 37
End: 2022-09-15

## 2022-09-15 PROCEDURE — 99441: CPT

## 2022-09-16 NOTE — REASON FOR VISIT
[Home] : at home, [unfilled] , at the time of the visit. [Medical Office: ([facility])___] : at the medical office located in  [Verbal consent obtained from patient] : the patient, [unfilled] [FreeTextEntry1] : She had the xrays and she wanted to review the findings. The sagittal alignment is normal. She is doing better as she is now walking with a cane instead of a walker and she is back to work. We discussed options and potential risks of interventions and we agreed that in view of the fact that she is  better the risk of doing something is higher than the benefit.

## 2023-03-17 NOTE — OB RN DELIVERY SUMMARY - BABYS CARE PROVIDER NAME, OB PROFILE
Attempted call for discharge planning. No answer x 2. CM will re attempt. Alek Lr RN     Chart reviewed. Patient is from home; appears IPA. She has a PCP and insurance that assists with Rx when needed. CM will remain available for needs.  Alek Lr RN
Arleen

## 2023-08-01 NOTE — OB RN DELIVERY SUMMARY - NS_FINALEDD_OBGYN_ALL_OB_DT
22-Apr-2022 Pain Refusal Text: I offered to prescribe pain medication but the patient refused to take this medication.

## 2023-11-14 NOTE — OB PROVIDER DELIVERY SUMMARY - NSPPHNORISK_OBGYN_ALL_OB
In my judgment no risk for PPH has been identified at this time. ERP Laparoscopic Robotic Approach Supracervical Hysterectomy. Laparoscopic Robotic Approach Sacral Colpopexy, Midurethral Sling Cysto Possible Repair